# Patient Record
Sex: FEMALE | URBAN - METROPOLITAN AREA
[De-identification: names, ages, dates, MRNs, and addresses within clinical notes are randomized per-mention and may not be internally consistent; named-entity substitution may affect disease eponyms.]

---

## 2022-10-21 ENCOUNTER — PROCEDURE VISIT (OUTPATIENT)
Dept: SPORTS MEDICINE | Age: 22
End: 2022-10-21

## 2022-10-21 DIAGNOSIS — S39.012A STRAIN OF LUMBAR REGION, INITIAL ENCOUNTER: Primary | ICD-10-CM

## 2022-10-21 NOTE — PROGRESS NOTES
Athletic Training  Date of Report: 10/21/2022  Name: Veda Carr  School: [employer]  Sport:  Dance  : 2000  Age: 23 y.o. MRN: <[id number]>  Encounter:  [x] New AT [facility]     [] Follow-Up Visit    [] Other:   SUBJECTIVE:  Reason for Visit:    Chief Complaint   Patient presents with    Back Pain     Veda Carr is a 23y.o. year old, female who presents today for evaluation of a athletic injury involving the lumbar region. 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 and participates in  [employer] . Onset of the injury began  Monday  and injury occurred during practice. Current pain and symptoms include: aching. Current level of pain is a 4. Symptoms have been gradual since that time. Symptoms improve with medication: ibuprofen . Symptoms worsen with  dance . The patient is not reporting a disrupted sleeping pattern. The most comfortable sleeping position for the patient is  any . The patient is not reporting bowel or bladder control issues. Patient states legs have not given out with walking. Associated sounds or feelings at time of injury included: none. Treatment to date has included: medication: ibuprofen . Treatment has been somewhat helpful. Previous history involving the lumbar spine, includes:  general chronic low back pain in high school - all x-rays and MRI negative, no specific diagnosis . In the past, Hipolito Gonzales has found pain relief with eStim, acupuncture, and cupping. OBJECTIVE:   Physical Exam  Vital Signs:   [x] There were no vitals taken for this visit  Date/Time Taken         Blood Pressure         Pulse          Constitution:   Appearance: Veda Carr is [x] alert, [x] appears stated age, and [x] in no distress.                          Veda Carr general body habitus is:    [] Cachectic [] Thin [x] Normal [] Obese [] Morbidly Obese  Pulmonary: Rate   [] Fast [x] Normal [] Slow    Rhythm  [x] Regular [] Irregular   Volume [x] Adequate  [] Shallow [] Deep  Effort  [] Labored [x] Unlabored  Skin:  Color  [x] Normal [] Pale [] Cyanotic    Temperature [] Hot   [x] Warm [] Cool  [] Cold     Moisture [] Dry  [x] Moist [] Warm    Psychiatric:   [x] Good judgement and insight. [x] Oriented to [x] person, [x] place, and [x] time. [x] Mood appropriate for circumstances.   Gait & Station:   Gait:    [x] Normal  [] Antalgic  [] Trendelenburg  [] Steppage  [] Wide  [] Unsteady   Foot:   [x] Neutral  [] Pronated  [] Supinated  Foot Type:  [x] Neutral  [] Pes Planus  [] Pes Cavus  Assistive Device: [x] None  [] Brace  [] Cane  [] Crutches  [] Mera Livan  [] Wheelchair  [] Other:   Inspection:   Skin:   [x] Intact [] Abrasion  [] Laceration  Notes:   Ecchymosis:  [x] None [] Mild  [] Moderate  [] Severe  Notes:   Atrophy:  [x] None [] Mild  [] Moderate  [] Severe  Notes:   Effusion:  [x] None [] Mild  [] Moderate  [] Severe  Notes:   Deformity:  [x] None [] Mild  [] Moderate  [] Severe  Notes:   Scar / Surgical incision(s): [] A-Scope Portals  [] Open Surgical Incision(s)  Notes: None    Curvature:  Lordotic Curve: [x] Normal [] Accentuated  [] Reduced    Notes:  Shape of Chest: [x] Normal [] Abnormal  Notes:   Frontal Curvature: [x] Normal [] Abnormal  Notes:   Sagittal Curvature:  [x] Normal [] Abnormal  Notes:   Posture:   [x] Normal [] Abnormal  Notes:     Alignment:   [x] Alignment was not assessed   Normal Measured Findings/Notes Passively Correctable to Normal   Hip Alignment []  []   Leg Length []  []    []  []   Orthopaedic Exam: Lumbar Spine  Palpation:   Tenderness: [] None  [x] Mild [] Moderate [] Severe   at: Paravertebral Muscles  Crepitation: [x] None  [] Mild [] Moderate [] Severe   at: N/A  Effusion: [x] None  [] Mild [] Moderate [] Severe   at: N/A  Step Off Deformity: [x] None  [] Mild [] Moderate [] Severe   at: N/A  Deformity: None  Range of Motion: (Not assessed if not marked)  [x] Normal Flexibility / Mobility   ROM WNL PROM AROM OP Comments     L R L R L R    Trunk Flexion [x] Trunk Extension [x]       Mild Pain   Right Lateral Bending [x]          Left Lateral Bending [x]          Right Rotation [x]          Left Rotation [x]           []          Manual Muscle Test: (Not assessed if not marked)  [x] Normal Strength  MMT Left Right Comment   Trunk Flexion 5     Trunk Extension 5  Mild Pain   Trunk Rotation 5 5    Pelvic Elevation 5           Provocative Tests: (Not tested if not marked)   Negative Positive Positive Findings   Ligamentous      Spring Test [x] []    Stork Standing Test [x] []    Fracture      Rib Compression Test (A/P) [x] []    Rib Compression Test (Lateral) [x] []    Muscular      90-90 SLR Test [x] []    Unilateral SLR / Lasegue Test [x] []    Bilateral SLR Test [x] []    Brett Test [] []    Trendelenburg's Test [x] []    SI Joint      SI Compression [x] []    SI Distraction [x] []    FABERE [x] []    Gaenslen's Test [] []    Pelvic Rock [] []          Intrathecal      Valsalva's Maneuver [x] []    Wells SLR Test [x] []    Milgram [] []    Naffzinger [] []    S. Cord/Sciatic      Kernig / Brudzinski Sign [] []    Love Silvio / Jair Roles Test [] []    Sitting Root Test [] []    Femoral Nerve Traction [] []    Slump Test [x] []    Related      Stoop Test [x] []    Johnson [] []    Beevor's Sign [] []    Slump Test [] []    Miscellaneous       [] []     [] []    Reflex / Motor Function:  Gross motor weakness of hip:  [x] None [] Mild  [] Moderate [] Severe  Notes:   Gross motor weakness of knee: [x] None [] Mild  [] Moderate [] Severe  Notes:   Gross motor weakness of ankle: [x] None [] Mild  [] Moderate [] Severe  Notes:   Gross motor weakness of great toe: [x] None [] Mild  [] Moderate [] Severe  Notes:   Sensory / Neurologic Function:  [x] Sensation to light touch intact    [] Impaired:   [x] Deep tendon reflexes intact    [] Impaired:   [x] Coordination / proprioception intact  [] Impaired:   ASSESSMENT:   Diagnosis Orders   1.  Strain of lumbar region, initial encounter Clinical Impression: Strain  Status: As Tolerated    PLAN:  Treatment:  [] Rest  [x] Ice   [] Wrap  [] Elevate  [] Tape  [] First Aid/Wound [] Moist Heat  [] Crutches  [] Brace  [] Splint  [] Sling  [] Immobilizer   [] Whirlpool  [] Massage  [] Pneumatic  [x] Rehab/Exercise  [x] Other: eStim  Guardian Contacted: No  Comments / Instructions: Today we did 20 minutes ice and premod eStim for pain modulation. I would like to start Chapincito Stone on a core strength/stability program next week. Cleared for dance as tolerated. Follow-Up Care / Instructions:    Discharged: No  Electronically Signed By: Sunil Bruno, ATR, LAT, ATC

## 2022-11-04 ENCOUNTER — PROCEDURE VISIT (OUTPATIENT)
Dept: SPORTS MEDICINE | Age: 22
End: 2022-11-04

## 2022-11-04 DIAGNOSIS — S39.012D STRAIN OF LUMBAR REGION, SUBSEQUENT ENCOUNTER: Primary | ICD-10-CM

## 2022-11-04 NOTE — PROGRESS NOTES
Athletic Training Daily Treatment Note  Date:  2022    Patient Name:  Elissa Pennington    :  2000  MRN: <[id number]>  Medical/Treatment Diagnosis Information:     Diagnosis Orders   1. Strain of lumbar region, subsequent encounter            SUBJECTIVE:   Chief Complaints: Pian with arching back during dance  Pain level:  6/10  [] Resting Pain  [] Night Pain  [] N/T  [] Swelling  [x] Stiffness  [] Other:      Athelte reports that low back pain has been getting worse this week and wants to do some therapeutic exercises to get ahead of the pain before choreography camp this weekend. Today Jose Ahmadi did the following:  -foam roll low back, lateral gluteus, IT band  -Piriformis release  -Psoas release  - Cat/cows 20  - Quadruped Lumbar opens 10 ea side  -Pelvic tilts 20 total  -Towel slid hip flexor stretch 15 ea side  -Quadruped Pelvic Tilts   -Stir the pots 5cw/5ccw x3  -Bosu plank stir the pots 5cw/5ccw x3  -Medicine ball core stabilizations [id number]  -Clamshells 3x10 bilaterally  -Lateral walks 2x10  -Monster walks 2x10        Treatment/Activity Tolerance:  [x] Patient tolerated treatment well [] Patient limited by fatigue  [] Patient limited by pain  [] Patient limited by other medical complications  [] Other:     Athlete will return on Monday to continue therapeutic exercise for hip and core strengthening.

## 2022-11-07 ENCOUNTER — PROCEDURE VISIT (OUTPATIENT)
Dept: SPORTS MEDICINE | Age: 22
End: 2022-11-07

## 2022-11-07 DIAGNOSIS — S39.012D STRAIN OF LUMBAR REGION, SUBSEQUENT ENCOUNTER: Primary | ICD-10-CM

## 2022-11-07 NOTE — PROGRESS NOTES
Subjective:      Patient ID: Aysha Bowie is a 23 y.o. female. Khloe Mccann comes in today for therapeutic exercises for her lower back pain. She reports that her lower back is feeling very sore after two 8+ hour dance days. She sometimes will have sharp pain with certain ADL's but described her pain as more consistently sore. She is having no problems sleeping or sitting in class at the moment. Objective:   Physical Exam    Assessment:       Diagnosis Orders   1. Strain of lumbar region, subsequent encounter               Plan: Today Khloe Mccann did the following exercises:  Foam roll low back, lateral gluteus, IT band  Piriformis release  Manual Brett Stretch [id number]  Band Assisted Hip Flexor Stretch [id number] each  Cat/cows 20x each  Quadruped Lumbar opens 10 ea side  BOSU Plank Walk-Overs  Stir the Pots on SB   Clamshells 3x10 bilaterally  Lateral walks 2x10  Monster walks 2x10  Premod bilateral low back    Khloe Mccann did well with all exercises today. She began to experience an increase in soreness towards the end of rehab. Will continue rehab on Friday.           Zohreh Jack, ATC

## 2022-11-11 ENCOUNTER — PROCEDURE VISIT (OUTPATIENT)
Dept: SPORTS MEDICINE | Age: 22
End: 2022-11-11

## 2022-11-11 DIAGNOSIS — S39.012D STRAIN OF LUMBAR REGION, SUBSEQUENT ENCOUNTER: Primary | ICD-10-CM

## 2022-11-11 NOTE — PROGRESS NOTES
Subjective:      Patient ID: Erlinda Yañez is a 23 y.o. female. Abril Gonzalez comes in today for therapeutic exercises for her lower back pain. She reports that her lower back is feeling extra sore and tight today. Objective:   Physical Exam    Assessment:       Diagnosis Orders   1. Strain of lumbar region, subsequent encounter               Plan: Today Abril Gonzalez did the following exercises:  Foam roll low back, lateral gluteus, IT band  Piriformis release  Manual Brett Stretch [id number]  Band Assisted Hip Flexor Stretch [id number] each  Cat/cows 20x each  Quadruped Lumbar opens 10 ea side  Reverse towel slides 3x10 ea  Med ball donkey kicks 3x10 ea  4 way hip kickbacks 30 total  HS curls 5# 3x10 ea  Clamshells 30 bilaterally  Monsterwalks 4x10  15 min IFC and Ice LBP        Abril Gonzalez did well with all exercises today. She began to experience an increase in soreness towards the end of rehab. She will return on Monday to continue HS strengthening and hip flexor stretching.          Malena Abbott, ATC

## 2023-02-27 ENCOUNTER — OFFICE VISIT (OUTPATIENT)
Dept: ORTHOPEDIC SURGERY | Age: 23
End: 2023-02-27
Payer: COMMERCIAL

## 2023-02-27 VITALS — WEIGHT: 121 LBS | HEIGHT: 64 IN | BODY MASS INDEX: 20.66 KG/M2

## 2023-02-27 DIAGNOSIS — M79.675 PAIN OF LEFT GREAT TOE: ICD-10-CM

## 2023-02-27 DIAGNOSIS — M89.9 SESAMOID PAIN: ICD-10-CM

## 2023-02-27 DIAGNOSIS — M79.672 LEFT FOOT PAIN: Primary | ICD-10-CM

## 2023-02-27 DIAGNOSIS — E65 FAT PAD SYNDROME: ICD-10-CM

## 2023-02-27 PROCEDURE — 99204 OFFICE O/P NEW MOD 45 MIN: CPT | Performed by: EMERGENCY MEDICINE

## 2023-02-27 PROCEDURE — L4360 PNEUMAT WALKING BOOT PRE CST: HCPCS | Performed by: EMERGENCY MEDICINE

## 2023-02-27 RX ORDER — CETIRIZINE HYDROCHLORIDE 10 MG/1
CAPSULE, LIQUID FILLED ORAL
COMMUNITY

## 2023-02-27 RX ORDER — NORETHINDRONE ACETATE AND ETHINYL ESTRADIOL 1MG-20(21)
KIT ORAL
COMMUNITY

## 2023-02-27 ASSESSMENT — ENCOUNTER SYMPTOMS
RHINORRHEA: 0
ABDOMINAL PAIN: 0
SHORTNESS OF BREATH: 0

## 2023-02-27 NOTE — PROGRESS NOTES
NEW PATIENT VISIT  CC: Foot Pain (LEFT FOOT)    Referring Provider: Robert Quick [facility]     HPI:    Karen Francois is a 23 y.o. female who presents for evaluation of left foot pain. Patient reports an initial injury about 1 year ago. She states that she landed wrong on her right heel. She states that this got a little bit better but never fully improved. She states that she never sought treatment for her heel pain. She did try a heel gel pad, with significant improvement of her symptoms. She has continued to participate in dance. She now is having worsening pain over the top of the left foot. She reports 6 out of 10 pain. No numbness or tingling. She has been using ice. She was referred by [facility] . She states that she has pain, particularly worse with ambulation and toe off. She also reports worsening with landing her jumps and Aerials. She describes the pain as sharp and aching. She denies any other complaints. History reviewed. No pertinent past medical history. 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    Medications  Current Outpatient Medications   Medication Sig Dispense Refill    Cetirizine HCl (ZYRTEC ALLERGY) 10 MG CAPS Take by mouth (Patient not taking: Reported on 2/27/2023)      norethindrone-ethinyl estradiol (JUNEL FE 1/20) 1-20 MG-MCG per tablet        No current facility-administered medications for this visit. Allergies  Allergies   Allergen Reactions    Amoxicillin-Pot Clavulanate     Penicillin G        Review of Systems:  Review of Systems   Constitutional:  Negative for activity change. HENT:  Negative for rhinorrhea. Respiratory:  Negative for shortness of breath. Cardiovascular:  Negative for chest pain. Gastrointestinal:  Negative for abdominal pain. Musculoskeletal:  Negative for gait problem, joint swelling and myalgias. Skin:  Negative for rash.    Allergic/Immunologic: Negative for immunocompromised state. Neurological:  Negative for numbness. Physical Examination:  General: Well appearing female, in no acute distress  Respiratory: Normal respiratory effort  Cardiovascular: No visual or palpable edema  Skin: no identified rashes, no induration, erythema or cyanosis  Neurologic: Light touch sensation is intact, no allodynia or hyperalgesia  Gait: Normal gait and station  Extremities: No evidence of clubbing, cyanosis, tenosynovitis or nail pitting  MSK: Left foot  Inspection/Palpation: Tenderness to palpation over the calcaneal fat pad, tenderness to palpation over the extensor hallucis longus, minimal tenderness to palpation over the medial sesamoid, no gross deformity, sensation grossly intact to light touch, 2+ DP pulse  ROM: Full range of motion  Stability: No instability  Strength/Tone: 5/5 strength of the entire ankle and foot but pain with resisted dorsiflexion of the great toe, minimal pain with plantarflexion of the great toe    Radiology:  3 view X-rays of the left foot dated 2/27/2023 were reviewed independently and discussed with the patient. The films revealed: Bipartite versus acute fracture of the medial sesamoid, no other acute osseous abnormalities    Assessment/Treatment Plan: Dariel Kirkland is a 23 y.o. female with:    1. Left foot pain  -     XR FOOT LEFT (MIN 3 VIEWS); Future  -     Breg / Airselect Short Walking Boot  2. Fat pad syndrome  3. Sesamoid pain  4. Pain of left great toe    Patient is seen and examined in the office today. She is complaining of acute on chronic left foot pain. Prior injury about a year ago with left heel pain. This is gotten a little bit better but still persistent pain. Now, she is having pain along the dorsal and plantar midfoot. On exam, she has tenderness to palpation over the calcaneal fat pad. X-ray is negative for a bone spur. Negative squeeze of the calcaneus.   Low suspicion for occult fracture or stress fracture of the calcaneus. I am concerned that the clinical presentation of her heel pain is most likely consistent with calcaneal fat pad syndrome. For this, I would like to place her in a low tide walking boot with a gel heel cup. She is also notable to have tenderness to palpation along the extensor hallucis longus and pain with resisted dorsiflexion of the great toe. This makes me concerned for the possibility of tendinosis and irritation of the extensor hallicus longus. Symptoms are particularly worse with toeing off. Therefore, I would like to prevent the toe off motion by putting the patient in a low tide walking boot. She also has very minimal tenderness to palpation over the medial sesamoid. X-ray reveals a bipartite sesamoid versus acute fracture of the medial sesamoid. No specific injury related to this pain. Most likely related to bipartite sesamoid pain. However, I cannot fully rule out acute fracture of the medial sesamoid. Therefore, we discussed an MRI of the left foot. However, patient would like to hold off on this. Instead, she would like to proceed with conservative management including the low tide walking boot and weightbearing as tolerated. Aleve twice a day for 2 weeks. Ice massages. Therapy with her . Follow-up in 2 weeks for reevaluation. If no improvement of symptoms, we discussed the importance of proceeding with an MRI of the left foot. Orders Placed This Encounter   Procedures    XR FOOT LEFT (MIN 3 VIEWS)     Standing Status:   Future     Number of Occurrences:   1     Standing Expiration Date:   3/27/2023     Order Specific Question:   Reason for exam:     Answer:   Pain    Breg / Ronni Benton Short Walking Boot     Patient was prescribed a Short Walking Floqq. The left foot will require stabilization / immobilization from this semi-rigid / rigid orthosis to improve their function.   The orthosis will assist in protecting the affected area, provide functional support and facilitate healing. Patient was instructed to progress ambulation weight bearing as tolerated in the device. The patient was educated and fit by a healthcare professional with expert knowledge and specialization in brace application while under the direct supervision of the physician. Verbal and written instructions for the use of and application of this item were provided. They were instructed to contact the office immediately should the brace result in increased pain, decreased sensation, increased swelling or worsening of the condition. Follow-up:   Return in about 2 weeks (around 3/13/2023). Sooner with any problems, questions, concerns, or worsening symptoms. Electronically signed by Elizabeth Butler MD on 2/27/2023 at 3:20 PM.    Disclaimer: This note was dictated with voice recognition software. Though review and correction are routine, we apologize for any errors.

## 2023-03-13 ENCOUNTER — OFFICE VISIT (OUTPATIENT)
Dept: ORTHOPEDIC SURGERY | Age: 23
End: 2023-03-13

## 2023-03-13 VITALS — WEIGHT: 121 LBS | HEIGHT: 64 IN | BODY MASS INDEX: 20.66 KG/M2

## 2023-03-13 DIAGNOSIS — M79.672 LEFT FOOT PAIN: Primary | ICD-10-CM

## 2023-03-16 ASSESSMENT — ENCOUNTER SYMPTOMS
SHORTNESS OF BREATH: 0
ABDOMINAL PAIN: 0
RHINORRHEA: 0

## 2023-03-16 NOTE — PROGRESS NOTES
FOLLOW UP VISIT    Chief Complaint   Patient presents with    Foot Pain     Left foot       HPI:    Alex Boston is a 23 y.o. female who presents for follow-up evaluation of left foot pain. At the last visit on 2/27/2023, the patient was presenting with left foot pain. We had a long discussion regarding conservative management versus proceeding directly to MRI. The patient elected for conservative management in a boot and follow up in 2 weeks. Since the last visit, Alex Boston has noted improvement of her symptoms some mild persistent pain over the 1st metatarsal, despite being in the boot. Therefore, she is presenting with requests to proceed with MRI. Review of Systems:  Review of Systems   Constitutional:  Negative for activity change. HENT:  Negative for rhinorrhea. Respiratory:  Negative for shortness of breath. Cardiovascular:  Negative for chest pain. Gastrointestinal:  Negative for abdominal pain. Musculoskeletal:  Negative for gait problem, joint swelling and myalgias. Left foot pain   Skin:  Negative for rash. Allergic/Immunologic: Negative for immunocompromised state. Neurological:  Negative for numbness. Medical History  Patient's medications, allergies, past medical, surgical, social, and family histories were reviewed and updated as appropriate.     Physical Examination:  General: Well appearing female, in no acute distress  Respiratory: Normal respiratory effort  Cardiovascular: No visual or palpable edema  Skin: no identified rashes, no induration, erythema or cyanosis  Neurologic: Light touch sensation is intact, no allodynia or hyperalgesia  Gait: Normal gait and station  Extremities: No evidence of clubbing, cyanosis, tenosynovitis or nail pitting  MSK:  Left foot  Inspection/Palpation: No TTP over the calcaneal fat pad, tenderness to palpation over the extensor hallucis longus, minimal tenderness to palpation over the medial sesamoid, no gross deformity, sensation grossly intact to light touch, 2+ DP pulse  ROM: Full range of motion  Stability: No instability  Strength/Tone: 5/5 strength of the entire ankle and foot but pain with resisted dorsiflexion of the great toe, minimal pain with plantarflexion of the great toe      Radiology:  3 view X-rays of the left foot dated 2/27/2023 were reviewed independently and discussed with the patient. The films revealed: Bipartite versus acute fracture of the medial sesamoid, no other acute osseous abnormalities    Assessment/Treatment Plan: Nish Cintron is a 23 y.o. female with:    1. Left foot pain  -     MRI FOOT LEFT WO CONTRAST; Future    The patient was seen and examined in the office today. She is presenting with persistent left foot pain despite being in a boot with conservative management. Persistent tenderness to palpation on exam.  Therefore, I would like to proceed with advance imaging including an MRI to evaluate for stress fracture. I would like her to continue in the low tide walking boot and follow up for MRI review. Orders Placed This Encounter   Procedures    MRI FOOT LEFT WO CONTRAST     Standing Status:   Future     Standing Expiration Date:   6/13/2023     Scheduling Instructions:      Pro Scan [place]     Order Specific Question:   Reason for exam:     Answer:   Left Foot Pain       Follow-up:   Return in about 1 week (around 3/20/2023) for MRI review. Sooner with any problems, questions, concerns, or worsening symptoms. Electronically signed by Giovanny Orona MD on 3/16/2023 at 9:04 AM.      Disclaimer: This note was dictated with voice recognition software. Though review and correction are routine, we apologize for any errors.